# Patient Record
Sex: FEMALE | Race: WHITE | NOT HISPANIC OR LATINO | URBAN - METROPOLITAN AREA
[De-identification: names, ages, dates, MRNs, and addresses within clinical notes are randomized per-mention and may not be internally consistent; named-entity substitution may affect disease eponyms.]

---

## 2020-10-23 ENCOUNTER — APPOINTMENT (RX ONLY)
Dept: URBAN - METROPOLITAN AREA OTHER 4 | Facility: OTHER | Age: 80
Setting detail: DERMATOLOGY
End: 2020-10-23

## 2020-10-23 DIAGNOSIS — L82.1 OTHER SEBORRHEIC KERATOSIS: ICD-10-CM

## 2020-10-23 DIAGNOSIS — D22 MELANOCYTIC NEVI: ICD-10-CM

## 2020-10-23 DIAGNOSIS — D18.0 HEMANGIOMA: ICD-10-CM

## 2020-10-23 DIAGNOSIS — L20.89 OTHER ATOPIC DERMATITIS: ICD-10-CM

## 2020-10-23 DIAGNOSIS — D485 NEOPLASM OF UNCERTAIN BEHAVIOR OF SKIN: ICD-10-CM

## 2020-10-23 DIAGNOSIS — L57.8 OTHER SKIN CHANGES DUE TO CHRONIC EXPOSURE TO NONIONIZING RADIATION: ICD-10-CM

## 2020-10-23 PROBLEM — L20.84 INTRINSIC (ALLERGIC) ECZEMA: Status: ACTIVE | Noted: 2020-10-23

## 2020-10-23 PROBLEM — D18.01 HEMANGIOMA OF SKIN AND SUBCUTANEOUS TISSUE: Status: ACTIVE | Noted: 2020-10-23

## 2020-10-23 PROBLEM — D22.5 MELANOCYTIC NEVI OF TRUNK: Status: ACTIVE | Noted: 2020-10-23

## 2020-10-23 PROBLEM — D48.5 NEOPLASM OF UNCERTAIN BEHAVIOR OF SKIN: Status: ACTIVE | Noted: 2020-10-23

## 2020-10-23 PROCEDURE — 11102 TANGNTL BX SKIN SINGLE LES: CPT

## 2020-10-23 PROCEDURE — ? BIOPSY BY SHAVE METHOD

## 2020-10-23 PROCEDURE — 99203 OFFICE O/P NEW LOW 30 MIN: CPT | Mod: 25

## 2020-10-23 PROCEDURE — ? COUNSELING

## 2020-10-23 PROCEDURE — ? OBSERVATION

## 2020-10-23 ASSESSMENT — LOCATION SIMPLE DESCRIPTION DERM
LOCATION SIMPLE: LEFT LOWER BACK
LOCATION SIMPLE: LEFT THIGH
LOCATION SIMPLE: RIGHT FOREHEAD
LOCATION SIMPLE: RIGHT THIGH
LOCATION SIMPLE: UPPER BACK
LOCATION SIMPLE: LEFT UPPER BACK

## 2020-10-23 ASSESSMENT — LOCATION DETAILED DESCRIPTION DERM
LOCATION DETAILED: RIGHT SUPERIOR FOREHEAD
LOCATION DETAILED: LEFT MID-UPPER BACK
LOCATION DETAILED: RIGHT ANTERIOR PROXIMAL THIGH
LOCATION DETAILED: LEFT SUPERIOR MEDIAL MIDBACK
LOCATION DETAILED: LEFT INFERIOR UPPER BACK
LOCATION DETAILED: SUPERIOR THORACIC SPINE
LOCATION DETAILED: LEFT ANTERIOR PROXIMAL THIGH
LOCATION DETAILED: LEFT INFERIOR MEDIAL UPPER BACK

## 2020-10-23 ASSESSMENT — LOCATION ZONE DERM
LOCATION ZONE: FACE
LOCATION ZONE: LEG
LOCATION ZONE: TRUNK

## 2020-10-23 ASSESSMENT — SEVERITY ASSESSMENT 2020: SEVERITY 2020: MILD

## 2020-10-23 NOTE — PROCEDURE: BIOPSY BY SHAVE METHOD
Detail Level: Detailed
Depth Of Biopsy: dermis
Was A Bandage Applied: Yes
Size Of Lesion In Cm: 0.3
X Size Of Lesion In Cm: 0.2
Biopsy Type: H and E
Biopsy Method: Personna blade
Anesthesia Type: 1% lidocaine with 1:200,000 epinephrine
Anesthesia Volume In Cc: 1.5
Additional Anesthesia Volume In Cc (Will Not Render If 0): 0
Hemostasis: Aluminum Chloride
Wound Care: Petrolatum
Dressing: bandage
Destruction After The Procedure: No
Type Of Destruction Used: Curettage
Cryotherapy Text: The wound bed was treated with cryotherapy after the biopsy was performed.
Electrodesiccation Text: The wound bed was treated with electrodesiccation after the biopsy was performed.
Electrodesiccation And Curettage Text: The wound bed was treated with electrodesiccation and curettage after the biopsy was performed.
Silver Nitrate Text: The wound bed was treated with silver nitrate after the biopsy was performed.
Lab: [id number]
Lab Facility: [id number]
Consent: Written consent was obtained and risks were reviewed including but not limited to scarring, infection, bleeding, scabbing, incomplete removal, nerve damage and allergy to anesthesia.
Post-Care Instructions: I reviewed with the patient in detail post-care instructions. Patient is to keep the biopsy site dry overnight, and then apply polysporin/Vaseline x 3 days.
Notification Instructions: Patient will be notified of biopsy results. However, patient instructed to call the office if not contacted within 2 weeks.
Billing Type: Third-Party Bill
Information: Selecting Yes will display possible errors in your note based on the variables you have selected. This validation is only offered as a suggestion for you. PLEASE NOTE THAT THE VALIDATION TEXT WILL BE REMOVED WHEN YOU FINALIZE YOUR NOTE. IF YOU WANT TO FAX A PRELIMINARY NOTE YOU WILL NEED TO TOGGLE THIS TO 'NO' IF YOU DO NOT WANT IT IN YOUR FAXED NOTE.

## 2022-06-24 ENCOUNTER — CLAIMS CREATED FROM THE CLAIM WINDOW (OUTPATIENT)
Dept: URBAN - METROPOLITAN AREA CLINIC 27 | Facility: CLINIC | Age: 82
End: 2022-06-24
Payer: MEDICARE

## 2022-06-24 ENCOUNTER — WEB ENCOUNTER (OUTPATIENT)
Dept: URBAN - METROPOLITAN AREA CLINIC 27 | Facility: CLINIC | Age: 82
End: 2022-06-24

## 2022-06-24 ENCOUNTER — LAB OUTSIDE AN ENCOUNTER (OUTPATIENT)
Dept: URBAN - METROPOLITAN AREA CLINIC 27 | Facility: CLINIC | Age: 82
End: 2022-06-24

## 2022-06-24 VITALS — HEIGHT: 67 IN | WEIGHT: 160 LBS | BODY MASS INDEX: 25.11 KG/M2

## 2022-06-24 DIAGNOSIS — N39.42 INCONTINENCE WITHOUT SENSORY AWARENESS: ICD-10-CM

## 2022-06-24 DIAGNOSIS — K63.5 POLYP OF COLON: ICD-10-CM

## 2022-06-24 DIAGNOSIS — K21.9 GERD: ICD-10-CM

## 2022-06-24 DIAGNOSIS — Q75.2 HYPERTELORISM: ICD-10-CM

## 2022-06-24 PROCEDURE — 99244 OFF/OP CNSLTJ NEW/EST MOD 40: CPT | Performed by: INTERNAL MEDICINE

## 2022-06-24 PROCEDURE — 99204 OFFICE O/P NEW MOD 45 MIN: CPT | Performed by: INTERNAL MEDICINE

## 2022-06-24 RX ORDER — OMEPRAZOLE 10 MG/1
CAPSULE, DELAYED RELEASE ORAL
Qty: 0 | Refills: 0 | Status: ACTIVE | COMMUNITY
Start: 1900-01-01 | End: 1900-01-01

## 2022-06-24 RX ORDER — LEVOTHYROXINE SODIUM 200 UG/1
TAKE 1 TABLET (200 MCG) BY ORAL ROUTE ONCE DAILY TABLET ORAL 1
Qty: 0 | Refills: 0 | Status: ACTIVE | COMMUNITY
Start: 1900-01-01 | End: 1900-01-01

## 2022-06-24 RX ORDER — POTASSIUM CHLORIDE 750 MG/1
TAKE 2 CAPSULES (20 MEQ) BY ORAL ROUTE 2 TIMES PER DAY WITH FOOD CAPSULE, EXTENDED RELEASE ORAL 2
Qty: 0 | Refills: 0 | Status: ACTIVE | COMMUNITY
Start: 1900-01-01 | End: 1900-01-01

## 2022-06-24 RX ORDER — ZOLPIDEM TARTRATE 10 MG/1
TABLET, FILM COATED ORAL
Qty: 0 | Refills: 0 | Status: ACTIVE | COMMUNITY
Start: 1900-01-01 | End: 1900-01-01

## 2022-06-24 NOTE — HPI-TODAY'S VISIT:
83 yo female seen as a new patient for episodes of incontinance. Two vaignal births with episiotomy. States last colon over 4 year ago with multiple polyps removed. Recent CT scan abd unrevealing. No bleeding. Otherwise without complaints. Heartburn symptoms stable. No dysphagia.

## 2022-06-25 LAB
ALBUMIN/GLOBULIN RATIO: 1.8
ALBUMIN: 3.9
ALKALINE PHOSPHATASE: 69
ALT (SGPT): 8
AST (SGOT): 10
BILIRUBIN, DIRECT: 0.1
BILIRUBIN, INDIRECT: 0.5
BILIRUBIN, TOTAL: 0.6
GLOBULIN: 2.2
INR: 0.9
PROTEIN, TOTAL: 6.1
PT: 9.8

## 2022-06-29 ENCOUNTER — LAB OUTSIDE AN ENCOUNTER (OUTPATIENT)
Dept: URBAN - METROPOLITAN AREA CLINIC 27 | Facility: CLINIC | Age: 82
End: 2022-06-29

## 2022-06-29 PROBLEM — 22006008 HYPERTELORISM: Status: ACTIVE | Noted: 2022-06-29

## 2022-06-29 PROBLEM — 235595009 GASTROESOPHAGEAL REFLUX DISEASE: Status: ACTIVE | Noted: 2022-06-29

## 2022-08-31 PROBLEM — 68496003 POLYP OF COLON: Status: ACTIVE | Noted: 2022-06-29

## 2022-08-31 PROBLEM — 448521006 INCONTINENCE WITHOUT SENSORY AWARENESS: Status: ACTIVE | Noted: 2022-06-29

## 2022-09-14 ENCOUNTER — APPOINTMENT (RX ONLY)
Dept: URBAN - METROPOLITAN AREA OTHER 4 | Facility: OTHER | Age: 82
Setting detail: DERMATOLOGY
End: 2022-09-14

## 2022-09-14 DIAGNOSIS — L82.1 OTHER SEBORRHEIC KERATOSIS: ICD-10-CM

## 2022-09-14 DIAGNOSIS — L81.4 OTHER MELANIN HYPERPIGMENTATION: ICD-10-CM

## 2022-09-14 DIAGNOSIS — D18.0 HEMANGIOMA: ICD-10-CM

## 2022-09-14 DIAGNOSIS — B35.1 TINEA UNGUIUM: ICD-10-CM

## 2022-09-14 PROBLEM — D18.01 HEMANGIOMA OF SKIN AND SUBCUTANEOUS TISSUE: Status: ACTIVE | Noted: 2022-09-14

## 2022-09-14 PROCEDURE — ? COUNSELING

## 2022-09-14 PROCEDURE — 99213 OFFICE O/P EST LOW 20 MIN: CPT

## 2022-09-14 ASSESSMENT — LOCATION SIMPLE DESCRIPTION DERM
LOCATION SIMPLE: RIGHT UPPER BACK
LOCATION SIMPLE: RIGHT GREAT TOE
LOCATION SIMPLE: LEFT THIGH
LOCATION SIMPLE: LEFT UPPER BACK
LOCATION SIMPLE: UPPER BACK

## 2022-09-14 ASSESSMENT — LOCATION ZONE DERM
LOCATION ZONE: TRUNK
LOCATION ZONE: TOENAIL
LOCATION ZONE: LEG

## 2022-09-14 ASSESSMENT — LOCATION DETAILED DESCRIPTION DERM
LOCATION DETAILED: LEFT SUPERIOR UPPER BACK
LOCATION DETAILED: INFERIOR THORACIC SPINE
LOCATION DETAILED: RIGHT SUPERIOR UPPER BACK
LOCATION DETAILED: RIGHT GREAT TOENAIL
LOCATION DETAILED: LEFT ANTERIOR DISTAL THIGH

## 2022-09-29 ENCOUNTER — TELEPHONE ENCOUNTER (OUTPATIENT)
Dept: URBAN - METROPOLITAN AREA CLINIC 27 | Facility: CLINIC | Age: 82
End: 2022-09-29

## 2022-10-03 ENCOUNTER — TELEPHONE ENCOUNTER (OUTPATIENT)
Dept: URBAN - METROPOLITAN AREA CLINIC 27 | Facility: CLINIC | Age: 82
End: 2022-10-03

## 2022-10-04 ENCOUNTER — CLAIMS CREATED FROM THE CLAIM WINDOW (OUTPATIENT)
Dept: URBAN - METROPOLITAN AREA SURGERY CENTER 7 | Facility: SURGERY CENTER | Age: 82
End: 2022-10-04
Payer: MEDICARE

## 2022-10-04 DIAGNOSIS — Z86.010 ADENOMAS PERSONAL HISTORY OF COLONIC POLYPS: ICD-10-CM

## 2022-10-04 DIAGNOSIS — D12.3 ADENOMA OF TRANSVERSE COLON: ICD-10-CM

## 2022-10-04 PROCEDURE — 45385 COLONOSCOPY W/LESION REMOVAL: CPT | Performed by: INTERNAL MEDICINE

## 2022-10-04 PROCEDURE — G8907 PT DOC NO EVENTS ON DISCHARG: HCPCS | Performed by: CLINIC/CENTER

## 2022-10-04 PROCEDURE — 45385 COLONOSCOPY W/LESION REMOVAL: CPT | Performed by: CLINIC/CENTER

## 2022-10-04 PROCEDURE — G8907 PT DOC NO EVENTS ON DISCHARG: HCPCS | Performed by: INTERNAL MEDICINE

## 2022-10-04 RX ORDER — OMEPRAZOLE 10 MG/1
CAPSULE, DELAYED RELEASE ORAL
Qty: 0 | Refills: 0 | Status: ACTIVE | COMMUNITY
Start: 1900-01-01 | End: 1900-01-01

## 2022-10-04 RX ORDER — ZOLPIDEM TARTRATE 10 MG/1
TABLET, FILM COATED ORAL
Qty: 0 | Refills: 0 | Status: ACTIVE | COMMUNITY
Start: 1900-01-01 | End: 1900-01-01

## 2022-10-04 RX ORDER — LEVOTHYROXINE SODIUM 200 UG/1
TAKE 1 TABLET (200 MCG) BY ORAL ROUTE ONCE DAILY TABLET ORAL 1
Qty: 0 | Refills: 0 | Status: ACTIVE | COMMUNITY
Start: 1900-01-01 | End: 1900-01-01

## 2022-10-04 RX ORDER — POTASSIUM CHLORIDE 750 MG/1
TAKE 2 CAPSULES (20 MEQ) BY ORAL ROUTE 2 TIMES PER DAY WITH FOOD CAPSULE, EXTENDED RELEASE ORAL 2
Qty: 0 | Refills: 0 | Status: ACTIVE | COMMUNITY
Start: 1900-01-01 | End: 1900-01-01

## 2022-10-06 ENCOUNTER — TELEPHONE ENCOUNTER (OUTPATIENT)
Dept: URBAN - METROPOLITAN AREA CLINIC 27 | Facility: CLINIC | Age: 82
End: 2022-10-06

## 2022-12-16 ENCOUNTER — TELEPHONE ENCOUNTER (OUTPATIENT)
Dept: URBAN - METROPOLITAN AREA CLINIC 6 | Facility: CLINIC | Age: 82
End: 2022-12-16

## 2023-05-01 ENCOUNTER — TELEPHONE ENCOUNTER (OUTPATIENT)
Dept: URBAN - METROPOLITAN AREA CLINIC 27 | Facility: CLINIC | Age: 83
End: 2023-05-01

## 2023-08-10 ENCOUNTER — OFFICE VISIT (OUTPATIENT)
Dept: URBAN - METROPOLITAN AREA CLINIC 27 | Facility: CLINIC | Age: 83
End: 2023-08-10
Payer: MEDICARE

## 2023-08-10 ENCOUNTER — OFFICE VISIT (OUTPATIENT)
Dept: URBAN - METROPOLITAN AREA CLINIC 27 | Facility: CLINIC | Age: 83
End: 2023-08-10

## 2023-08-10 VITALS
WEIGHT: 140 LBS | HEART RATE: 96 BPM | HEIGHT: 67 IN | BODY MASS INDEX: 21.97 KG/M2 | DIASTOLIC BLOOD PRESSURE: 88 MMHG | SYSTOLIC BLOOD PRESSURE: 117 MMHG

## 2023-08-10 DIAGNOSIS — K63.5 POLYP OF COLON: ICD-10-CM

## 2023-08-10 DIAGNOSIS — R11.2 NAUSEA & VOMITING: ICD-10-CM

## 2023-08-10 DIAGNOSIS — K21.9 GERD: ICD-10-CM

## 2023-08-10 PROCEDURE — 99213 OFFICE O/P EST LOW 20 MIN: CPT | Performed by: INTERNAL MEDICINE

## 2023-08-10 RX ORDER — POTASSIUM CHLORIDE 750 MG/1
TAKE 2 CAPSULES (20 MEQ) BY ORAL ROUTE 2 TIMES PER DAY WITH FOOD CAPSULE, EXTENDED RELEASE ORAL 2
Qty: 0 | Refills: 0 | Status: ACTIVE | COMMUNITY
Start: 1900-01-01 | End: 1900-01-01

## 2023-08-10 RX ORDER — LEVOTHYROXINE SODIUM 200 UG/1
TAKE 1 TABLET (200 MCG) BY ORAL ROUTE ONCE DAILY TABLET ORAL 1
Qty: 0 | Refills: 0 | Status: ACTIVE | COMMUNITY
Start: 1900-01-01 | End: 1900-01-01

## 2023-08-10 RX ORDER — OMEPRAZOLE 10 MG/1
CAPSULE, DELAYED RELEASE ORAL
Qty: 0 | Refills: 0 | Status: ACTIVE | COMMUNITY
Start: 1900-01-01 | End: 1900-01-01

## 2023-08-10 RX ORDER — ZOLPIDEM TARTRATE 10 MG/1
TABLET, FILM COATED ORAL
Qty: 0 | Refills: 0 | Status: ACTIVE | COMMUNITY
Start: 1900-01-01 | End: 1900-01-01

## 2023-08-10 NOTE — HPI-TODAY'S VISIT:
This is a 83-year-old female seen in consultation today for her dyspeptic symptoms and loose stools.  These have been chronic in nature.  Occasionally formed.  2-3 bowel movements a day.  She has lost about 20 pounds over the past 2 years.  She has nausea.  In discussing this further with her as in our previous conversations she is drinking about a half a bottle of vodka every day.  She has high anxiety and depression.  She feels the medicines are inadequate.  In terms of the reflux she takes pantoprazole.  She had a CT scan that suggest chronic liver disease.  She has atrial fibrillation and is on Xarelto.  She had a colonoscopy last year with a tubular adenoma removed.

## 2023-09-14 ENCOUNTER — OFFICE VISIT (OUTPATIENT)
Dept: URBAN - METROPOLITAN AREA CLINIC 27 | Facility: CLINIC | Age: 83
End: 2023-09-14

## 2023-10-12 ENCOUNTER — OFFICE VISIT (OUTPATIENT)
Dept: URBAN - METROPOLITAN AREA CLINIC 27 | Facility: CLINIC | Age: 83
End: 2023-10-12

## 2023-10-18 ENCOUNTER — LAB OUTSIDE AN ENCOUNTER (OUTPATIENT)
Dept: URBAN - METROPOLITAN AREA CLINIC 27 | Facility: CLINIC | Age: 83
End: 2023-10-18

## 2023-10-18 ENCOUNTER — DASHBOARD ENCOUNTERS (OUTPATIENT)
Age: 83
End: 2023-10-18

## 2023-10-18 ENCOUNTER — OFFICE VISIT (OUTPATIENT)
Dept: URBAN - METROPOLITAN AREA CLINIC 27 | Facility: CLINIC | Age: 83
End: 2023-10-18
Payer: MEDICARE

## 2023-10-18 VITALS
HEART RATE: 94 BPM | DIASTOLIC BLOOD PRESSURE: 85 MMHG | WEIGHT: 137 LBS | RESPIRATION RATE: 17 BRPM | BODY MASS INDEX: 21.5 KG/M2 | SYSTOLIC BLOOD PRESSURE: 118 MMHG | HEIGHT: 67 IN

## 2023-10-18 DIAGNOSIS — K63.5 POLYP OF COLON: ICD-10-CM

## 2023-10-18 DIAGNOSIS — K58.0 IRRITABLE BOWEL SYNDROME WITH DIARRHEA: ICD-10-CM

## 2023-10-18 DIAGNOSIS — K74.69 OTHER CIRRHOSIS OF LIVER: ICD-10-CM

## 2023-10-18 DIAGNOSIS — K21.9 GERD: ICD-10-CM

## 2023-10-18 PROCEDURE — 99214 OFFICE O/P EST MOD 30 MIN: CPT | Performed by: INTERNAL MEDICINE

## 2023-10-18 RX ORDER — LEVOTHYROXINE SODIUM 200 UG/1
TAKE 1 TABLET (200 MCG) BY ORAL ROUTE ONCE DAILY TABLET ORAL 1
Qty: 0 | Refills: 0 | Status: ACTIVE | COMMUNITY
Start: 1900-01-01 | End: 1900-01-01

## 2023-10-18 RX ORDER — POTASSIUM CHLORIDE 750 MG/1
TAKE 2 CAPSULES (20 MEQ) BY ORAL ROUTE 2 TIMES PER DAY WITH FOOD CAPSULE, EXTENDED RELEASE ORAL 2
Qty: 0 | Refills: 0 | Status: ACTIVE | COMMUNITY
Start: 1900-01-01 | End: 1900-01-01

## 2023-10-18 RX ORDER — OMEPRAZOLE 10 MG/1
CAPSULE, DELAYED RELEASE ORAL
Qty: 0 | Refills: 0 | Status: ACTIVE | COMMUNITY
Start: 1900-01-01 | End: 1900-01-01

## 2023-10-18 RX ORDER — ZOLPIDEM TARTRATE 10 MG/1
TABLET, FILM COATED ORAL
Qty: 0 | Refills: 0 | Status: ACTIVE | COMMUNITY
Start: 1900-01-01 | End: 1900-01-01

## 2023-10-18 NOTE — HPI-TODAY'S VISIT:
This is a 83-year-old female seen in follow-up consultation for her loose stools.  In the past she had loose stools.  She had a hip replacement a few weeks ago and since then has been more constipated.  She states she only took 1 pain pill.  Her bowels are now improving and have not returned to diarrhea.  She has stopped drinking alcohol about 6 weeks ago but was drinking regularly.  Anxiety plays a role into her physical symptoms as well.  She did disclose today that she is taking Tylenol a gram every 8 hours.  She has previously been seen by  who has diagnosed her with cirrhosis.  Overall she is feeling better and recovering well from the hip replacement.  She is getting ready to move to [place] for 6 months and then will be back in [place] in the spring

## 2023-10-19 ENCOUNTER — APPOINTMENT (RX ONLY)
Dept: URBAN - METROPOLITAN AREA OTHER 9 | Facility: OTHER | Age: 83
Setting detail: DERMATOLOGY
End: 2023-10-19

## 2023-10-19 DIAGNOSIS — L57.0 ACTINIC KERATOSIS: ICD-10-CM

## 2023-10-19 DIAGNOSIS — D18.0 HEMANGIOMA: ICD-10-CM

## 2023-10-19 DIAGNOSIS — L82.1 OTHER SEBORRHEIC KERATOSIS: ICD-10-CM

## 2023-10-19 DIAGNOSIS — D22 MELANOCYTIC NEVI: ICD-10-CM

## 2023-10-19 DIAGNOSIS — L57.8 OTHER SKIN CHANGES DUE TO CHRONIC EXPOSURE TO NONIONIZING RADIATION: ICD-10-CM

## 2023-10-19 PROBLEM — D22.5 MELANOCYTIC NEVI OF TRUNK: Status: ACTIVE | Noted: 2023-10-19

## 2023-10-19 PROBLEM — D18.01 HEMANGIOMA OF SKIN AND SUBCUTANEOUS TISSUE: Status: ACTIVE | Noted: 2023-10-19

## 2023-10-19 LAB
A/G RATIO: 2.1
ABSOLUTE BASOPHILS: 22
ABSOLUTE EOSINOPHILS: 43
ABSOLUTE LYMPHOCYTES: 1152
ABSOLUTE MONOCYTES: 346
ABSOLUTE NEUTROPHILS: 5638
ALBUMIN: 3.9
ALKALINE PHOSPHATASE: 87
ALT (SGPT): 23
AST (SGOT): 24
BASOPHILS: 0.3
BILIRUBIN, TOTAL: 0.4
BUN/CREATININE RATIO: 14
BUN: 21
CALCIUM: 9.1
CARBON DIOXIDE, TOTAL: 23
CHLORIDE: 106
CREATININE: 1.48
EGFR: 35
EOSINOPHILS: 0.6
GLOBULIN, TOTAL: 1.9
GLUCOSE: 110
HEMATOCRIT: 28.2
HEMOGLOBIN: 9.2
INR: 1.1
LYMPHOCYTES: 16
MCH: 32.4
MCHC: 32.6
MCV: 99.3
MONOCYTES: 4.8
MPV: 10
NEUTROPHILS: 78.3
PLATELET COUNT: 390
POTASSIUM: 5.2
PROTEIN, TOTAL: 5.8
PT: 11.4
RDW: 11.9
RED BLOOD CELL COUNT: 2.84
SODIUM: 141
WHITE BLOOD CELL COUNT: 7.2

## 2023-10-19 PROCEDURE — ? OBSERVATION

## 2023-10-19 PROCEDURE — ? LIQUID NITROGEN

## 2023-10-19 PROCEDURE — ? COUNSELING

## 2023-10-19 PROCEDURE — 99213 OFFICE O/P EST LOW 20 MIN: CPT | Mod: 25

## 2023-10-19 PROCEDURE — 17000 DESTRUCT PREMALG LESION: CPT

## 2023-10-19 ASSESSMENT — LOCATION ZONE DERM
LOCATION ZONE: FACE
LOCATION ZONE: TRUNK

## 2023-10-19 ASSESSMENT — LOCATION DETAILED DESCRIPTION DERM
LOCATION DETAILED: LEFT MEDIAL UPPER BACK
LOCATION DETAILED: LEFT LATERAL SUPERIOR CHEST
LOCATION DETAILED: RIGHT INFERIOR FOREHEAD
LOCATION DETAILED: UPPER STERNUM
LOCATION DETAILED: SUPERIOR THORACIC SPINE

## 2023-10-19 ASSESSMENT — LOCATION SIMPLE DESCRIPTION DERM
LOCATION SIMPLE: RIGHT FOREHEAD
LOCATION SIMPLE: UPPER BACK
LOCATION SIMPLE: CHEST
LOCATION SIMPLE: LEFT UPPER BACK

## 2023-10-30 ENCOUNTER — TELEPHONE ENCOUNTER (OUTPATIENT)
Dept: URBAN - METROPOLITAN AREA CLINIC 27 | Facility: CLINIC | Age: 83
End: 2023-10-30

## 2024-05-17 ENCOUNTER — TELEPHONE ENCOUNTER (OUTPATIENT)
Dept: URBAN - METROPOLITAN AREA CLINIC 27 | Facility: CLINIC | Age: 84
End: 2024-05-17

## 2024-06-03 ENCOUNTER — TELEPHONE ENCOUNTER (OUTPATIENT)
Dept: URBAN - METROPOLITAN AREA CLINIC 63 | Facility: CLINIC | Age: 84
End: 2024-06-03

## 2024-06-04 ENCOUNTER — TELEPHONE ENCOUNTER (OUTPATIENT)
Dept: URBAN - METROPOLITAN AREA CLINIC 27 | Facility: CLINIC | Age: 84
End: 2024-06-04

## 2024-07-17 ENCOUNTER — OFFICE VISIT (OUTPATIENT)
Dept: URBAN - METROPOLITAN AREA CLINIC 27 | Facility: CLINIC | Age: 84
End: 2024-07-17
Payer: MEDICARE

## 2024-07-17 VITALS
BODY MASS INDEX: 20.25 KG/M2 | HEIGHT: 67 IN | SYSTOLIC BLOOD PRESSURE: 133 MMHG | DIASTOLIC BLOOD PRESSURE: 97 MMHG | HEART RATE: 97 BPM | WEIGHT: 129 LBS

## 2024-07-17 DIAGNOSIS — Q75.2 HYPERTELORISM: ICD-10-CM

## 2024-07-17 DIAGNOSIS — K59.09 CHANGE IN BOWEL MOVEMENTS INTERMITTENT CONSTIPATION. URGENCY IN THE MORNING.: ICD-10-CM

## 2024-07-17 DIAGNOSIS — F10.10 ALCOHOL ABUSE: ICD-10-CM

## 2024-07-17 DIAGNOSIS — K74.69 OTHER CIRRHOSIS OF LIVER: ICD-10-CM

## 2024-07-17 DIAGNOSIS — K21.9 GERD: ICD-10-CM

## 2024-07-17 PROCEDURE — 99214 OFFICE O/P EST MOD 30 MIN: CPT | Performed by: INTERNAL MEDICINE

## 2024-07-17 RX ORDER — OMEPRAZOLE 10 MG/1
CAPSULE, DELAYED RELEASE ORAL
Qty: 0 | Refills: 0 | Status: DISCONTINUED | COMMUNITY
Start: 1900-01-01

## 2024-07-17 RX ORDER — POTASSIUM CHLORIDE 750 MG/1
TAKE 2 CAPSULES (20 MEQ) BY ORAL ROUTE 2 TIMES PER DAY WITH FOOD CAPSULE, EXTENDED RELEASE ORAL 2
Qty: 0 | Refills: 0 | Status: DISCONTINUED | COMMUNITY
Start: 1900-01-01

## 2024-07-17 RX ORDER — LEVOTHYROXINE SODIUM 200 UG/1
TAKE 1 TABLET (200 MCG) BY ORAL ROUTE ONCE DAILY TABLET ORAL 1
Qty: 0 | Refills: 0 | Status: ACTIVE | COMMUNITY
Start: 1900-01-01

## 2024-07-17 RX ORDER — ZOLPIDEM TARTRATE 10 MG/1
TABLET, FILM COATED ORAL
Qty: 0 | Refills: 0 | Status: ACTIVE | COMMUNITY
Start: 1900-01-01

## 2024-07-17 NOTE — HPI-TODAY'S VISIT:
This is a 84-year-old female seen in consultation today for her constipation and history of cirrhosis.  She is also followed by Dr. Bailon for the liver disease.  She has not seen him in about 10 months.  She has not been seen in our office as well.  She spent the winter in [place].  Over the wintertime she started drinking alcohol again.  She states that she stopped drinking yesterday.  She has lost about 30 pounds.  She has constipation on and off and uses stool and laxatives on and off.  She also takes pantoprazole intermittently but also has reflux symptoms.  She had a recent MRI which was unrevealing as far as the liver there is a mild dilation of the pancreatic duct which is stable.  She is status post hip replacement.  She has not had a recent upper endoscopy.  Dr. Saucedo is her PCP.  She has not seen him in a while as well

## 2024-07-18 LAB
A/G RATIO: 2.3
AFP, SERUM, TUMOR MARKER: 1.9
ALBUMIN: 4.5
ALKALINE PHOSPHATASE: 95
ALT (SGPT): 18
AST (SGOT): 16
BILIRUBIN, TOTAL: 0.7
BUN/CREATININE RATIO: 21
BUN: 28
CALCIUM: 9.7
CARBON DIOXIDE, TOTAL: 25
CHLORIDE: 100
CREATININE: 1.31
EGFR: 40
GLOBULIN, TOTAL: 2
GLUCOSE: 101
HEMATOCRIT: 37.2
HEMOGLOBIN: 12.2
INR: 1
MCH: 33.2
MCHC: 32.8
MCV: 101.1
MPV: 10.5
PLATELET COUNT: 239
POTASSIUM: 4.7
PROTEIN, TOTAL: 6.5
PT: 10.8
RDW: 12.7
RED BLOOD CELL COUNT: 3.68
SODIUM: 140
TSH: 0.23
WHITE BLOOD CELL COUNT: 6.2

## 2024-07-19 ENCOUNTER — TELEPHONE ENCOUNTER (OUTPATIENT)
Dept: URBAN - METROPOLITAN AREA CLINIC 27 | Facility: CLINIC | Age: 84
End: 2024-07-19

## 2024-07-19 RX ORDER — ONDANSETRON 4 MG/1
1 TABLET ON THE TONGUE AND ALLOW TO DISSOLVE TABLET, ORALLY DISINTEGRATING ORAL
Qty: 30 | Refills: 0 | OUTPATIENT
Start: 2024-07-22

## 2024-08-26 ENCOUNTER — OFFICE VISIT (OUTPATIENT)
Dept: URBAN - METROPOLITAN AREA CLINIC 27 | Facility: CLINIC | Age: 84
End: 2024-08-26
Payer: MEDICARE

## 2024-08-26 VITALS
SYSTOLIC BLOOD PRESSURE: 104 MMHG | DIASTOLIC BLOOD PRESSURE: 71 MMHG | BODY MASS INDEX: 19.87 KG/M2 | HEART RATE: 75 BPM | WEIGHT: 126.6 LBS | HEIGHT: 67 IN

## 2024-08-26 DIAGNOSIS — K21.9 GERD: ICD-10-CM

## 2024-08-26 DIAGNOSIS — R19.4 CHANGE IN BOWEL HABIT: ICD-10-CM

## 2024-08-26 DIAGNOSIS — N39.42 INCONTINENCE WITHOUT SENSORY AWARENESS: ICD-10-CM

## 2024-08-26 DIAGNOSIS — R63.4 ABNORMAL WEIGHT LOSS: ICD-10-CM

## 2024-08-26 DIAGNOSIS — Z12.11 ENCOUNTER FOR SCREENING FOR MALIGNANT NEOPLASM OF COLON: ICD-10-CM

## 2024-08-26 DIAGNOSIS — E53.8 B12 DEFICIENCY: ICD-10-CM

## 2024-08-26 PROCEDURE — 99214 OFFICE O/P EST MOD 30 MIN: CPT | Performed by: INTERNAL MEDICINE

## 2024-08-26 RX ORDER — LEVOTHYROXINE SODIUM 200 UG/1
TAKE 1 TABLET (200 MCG) BY ORAL ROUTE ONCE DAILY TABLET ORAL 1
Qty: 0 | Refills: 0 | Status: ACTIVE | COMMUNITY
Start: 1900-01-01

## 2024-08-26 RX ORDER — ONDANSETRON 4 MG/1
1 TABLET ON THE TONGUE AND ALLOW TO DISSOLVE TABLET, ORALLY DISINTEGRATING ORAL
Qty: 30 | Refills: 0 | Status: DISCONTINUED | COMMUNITY
Start: 2024-07-22

## 2024-08-26 RX ORDER — ZOLPIDEM TARTRATE 10 MG/1
TABLET, FILM COATED ORAL
Qty: 0 | Refills: 0 | Status: ACTIVE | COMMUNITY
Start: 1900-01-01

## 2024-08-26 NOTE — HPI-TODAY'S VISIT:
This is a 84-year-old female with a history of alcoholic cirrhosis seen in follow-up consultation today.  Her psychiatrist at [facility] is not working out for her.  She has been feeling more depressed.  She is not drinking alcohol currently but did drink for a week in the recent past.  She did reach out to Alcoholics Anonymous and is trying to go to a meeting. She is also complaining of some alternating hard stools and loose stools.  She also has some reflux but is only taking pantoprazole as needed.  She has had some mild weight loss to.  No recent colonoscopy.  She is taking oral B12 and was noted to have an elevated MCV on recent labs.  Recent liver enzymes were normal INR was 1 creatinine 1.3 alpha-fetoprotein 1.9.  She is followed for her cirrhosis with Dr. Burdick at [facility]

## 2024-08-27 ENCOUNTER — LAB OUTSIDE AN ENCOUNTER (OUTPATIENT)
Dept: URBAN - METROPOLITAN AREA CLINIC 27 | Facility: CLINIC | Age: 84
End: 2024-08-27

## 2024-08-27 LAB
FOLATE, SERUM: 8.4
VITAMIN B12: >2000

## 2024-09-04 ENCOUNTER — TELEPHONE ENCOUNTER (OUTPATIENT)
Dept: URBAN - METROPOLITAN AREA CLINIC 27 | Facility: CLINIC | Age: 84
End: 2024-09-04

## 2024-09-11 ENCOUNTER — OFFICE VISIT (OUTPATIENT)
Dept: URBAN - METROPOLITAN AREA SURGERY CENTER 7 | Facility: SURGERY CENTER | Age: 84
End: 2024-09-11

## 2024-11-18 ENCOUNTER — OFFICE VISIT (OUTPATIENT)
Dept: URBAN - METROPOLITAN AREA CLINIC 27 | Facility: CLINIC | Age: 84
End: 2024-11-18
Payer: MEDICARE

## 2024-11-18 VITALS
HEART RATE: 68 BPM | BODY MASS INDEX: 19.15 KG/M2 | DIASTOLIC BLOOD PRESSURE: 70 MMHG | WEIGHT: 122 LBS | HEIGHT: 67 IN | SYSTOLIC BLOOD PRESSURE: 104 MMHG

## 2024-11-18 DIAGNOSIS — R10.13 EPIGASTRIC PAIN: ICD-10-CM

## 2024-11-18 DIAGNOSIS — F10.10 ALCOHOL ABUSE: ICD-10-CM

## 2024-11-18 DIAGNOSIS — K74.69 OTHER CIRRHOSIS OF LIVER: ICD-10-CM

## 2024-11-18 PROCEDURE — 99214 OFFICE O/P EST MOD 30 MIN: CPT | Performed by: PHYSICIAN ASSISTANT

## 2024-11-18 RX ORDER — LEVOTHYROXINE SODIUM 200 UG/1
TAKE 1 TABLET (200 MCG) BY ORAL ROUTE ONCE DAILY TABLET ORAL 1
Qty: 0 | Refills: 0 | Status: ACTIVE | COMMUNITY
Start: 1900-01-01

## 2024-11-18 RX ORDER — ZOLPIDEM TARTRATE 10 MG/1
TABLET, FILM COATED ORAL
Qty: 0 | Refills: 0 | Status: ACTIVE | COMMUNITY
Start: 1900-01-01

## 2024-11-18 NOTE — HPI-ZZZTODAY'S VISIT
Ms. Carrillo is an 84-year-old here for follow up of her alcoholic cirrhosis with Dr. Brand. She is still actively drinking, had 2 glasses wine and some port last night; prior to that it had been 1 wk since she had had alcohol. She went to AA briefly but didn't think it was helpful as she denies having any cravings for alcohol. She is hoping to abstain entirely. She last saw Dr. Burdick in August 2023. She c/o some epigastric discomfort. She has alternating mushy and hard stools; no melena or hematochezia. She is on Xarelto for A fib, has a cardiology appt (Dr. Brian Null) later this wk. No prior EGD. . Colonoscopy 2022: 1 small adenomatous polyp, diverticulosis MRI abdomen 5/2024: No concerning liver lesion, follow-up 6 months, similar to prior dilatation of main PD, etiology uncertain

## 2024-11-19 ENCOUNTER — TELEPHONE ENCOUNTER (OUTPATIENT)
Dept: URBAN - METROPOLITAN AREA CLINIC 27 | Facility: CLINIC | Age: 84
End: 2024-11-19

## 2024-11-19 PROBLEM — 79922009: Status: ACTIVE | Noted: 2024-11-19

## 2024-11-19 LAB
A/G RATIO: 2.1
ABSOLUTE BASOPHILS: 20
ABSOLUTE EOSINOPHILS: 20
ABSOLUTE LYMPHOCYTES: 1215
ABSOLUTE MONOCYTES: 441
ABSOLUTE NEUTROPHILS: 3205
AFP, SERUM, TUMOR MARKER: 1.6
ALBUMIN: 4.4
ALKALINE PHOSPHATASE: 139
ALT (SGPT): 24
AST (SGOT): 24
BASOPHILS: 0.4
BILIRUBIN, TOTAL: 0.5
BUN/CREATININE RATIO: 26
BUN: 33
CALCIUM: 9.5
CARBON DIOXIDE, TOTAL: 26
CHLORIDE: 103
CREATININE: 1.28
EGFR: 41
EOSINOPHILS: 0.4
GLOBULIN, TOTAL: 2.1
GLUCOSE: 99
HEMATOCRIT: 34.7
HEMOGLOBIN: 11.4
INR: 1.1
LYMPHOCYTES: 24.8
MCH: 33.9
MCHC: 32.9
MCV: 103.3
MONOCYTES: 9
MPV: 10.5
NEUTROPHILS: 65.4
PLATELET COUNT: 220
POTASSIUM: 4.7
PROTEIN, TOTAL: 6.5
PT: 11.9
RDW: 12.8
RED BLOOD CELL COUNT: 3.36
SODIUM: 139
WHITE BLOOD CELL COUNT: 4.9

## 2024-11-25 ENCOUNTER — TELEPHONE ENCOUNTER (OUTPATIENT)
Dept: URBAN - METROPOLITAN AREA CLINIC 27 | Facility: CLINIC | Age: 84
End: 2024-11-25

## 2024-11-26 ENCOUNTER — TELEPHONE ENCOUNTER (OUTPATIENT)
Dept: URBAN - METROPOLITAN AREA CLINIC 27 | Facility: CLINIC | Age: 84
End: 2024-11-26

## 2024-12-02 ENCOUNTER — OFFICE VISIT (OUTPATIENT)
Dept: URBAN - METROPOLITAN AREA CLINIC 27 | Facility: CLINIC | Age: 84
End: 2024-12-02

## 2025-06-27 ENCOUNTER — OFFICE VISIT (OUTPATIENT)
Dept: URBAN - METROPOLITAN AREA CLINIC 27 | Facility: CLINIC | Age: 85
End: 2025-06-27

## 2025-06-27 RX ORDER — LEVOTHYROXINE SODIUM 75 UG/1
TAKE 1 TABLET (75 MCG) BY MOUTH DAILY BEFORE BREAKFAST. THIS REPLACES THE 100 MCG DOSE SIZE TABLET ORAL
Qty: 90 EACH | Refills: 0 | Status: ACTIVE | COMMUNITY

## 2025-06-27 RX ORDER — METOPROLOL SUCCINATE 25 MG/1
TABLET, FILM COATED, EXTENDED RELEASE ORAL
Qty: 25 TABLET | Status: ACTIVE | COMMUNITY

## 2025-06-27 RX ORDER — ZOLPIDEM TARTRATE 10 MG/1
TABLET, FILM COATED ORAL
Qty: 0 | Refills: 0 | Status: ACTIVE | COMMUNITY
Start: 1900-01-01

## 2025-06-27 RX ORDER — LEVOTHYROXINE SODIUM 200 UG/1
TAKE 1 TABLET (200 MCG) BY ORAL ROUTE ONCE DAILY TABLET ORAL 1
Qty: 0 | Refills: 0 | Status: DISCONTINUED | COMMUNITY
Start: 1900-01-01

## 2025-06-28 LAB
A/G RATIO: 2.6
ABSOLUTE BASOPHILS: 29
ABSOLUTE EOSINOPHILS: 59
ABSOLUTE LYMPHOCYTES: 1323
ABSOLUTE MONOCYTES: 490
ABSOLUTE NEUTROPHILS: 2999
ALBUMIN: 4.4
ALKALINE PHOSPHATASE: 101
ALT (SGPT): 20
AST (SGOT): 23
BASOPHILS: 0.6
BILIRUBIN, TOTAL: 0.7
BUN/CREATININE RATIO: 23
BUN: 27
CALCIUM: 9.3
CARBON DIOXIDE, TOTAL: 28
CHLORIDE: 100
CREATININE: 1.17
EGFR: 46
EOSINOPHILS: 1.2
GLOBULIN, TOTAL: 1.7
GLUCOSE: 91
HEMATOCRIT: 37.6
HEMOGLOBIN: 12.5
LYMPHOCYTES: 27
MCH: 33.5
MCHC: 33.2
MCV: 100.8
MONOCYTES: 10
MPV: 10.3
NEUTROPHILS: 61.2
PLATELET COUNT: 225
POTASSIUM: 4.7
PROTEIN, TOTAL: 6.1
RDW: 12
RED BLOOD CELL COUNT: 3.73
SODIUM: 137
WHITE BLOOD CELL COUNT: 4.9

## 2025-07-28 ENCOUNTER — OFFICE VISIT (OUTPATIENT)
Dept: URBAN - METROPOLITAN AREA CLINIC 27 | Facility: CLINIC | Age: 85
End: 2025-07-28
Payer: MEDICARE

## 2025-07-28 DIAGNOSIS — K21.9 GASTRO-ESOPHAGEAL REFLUX DISEASE WITHOUT ESOPHAGITIS: ICD-10-CM

## 2025-07-28 DIAGNOSIS — F10.10 ALCOHOL ABUSE, UNCOMPLICATED: ICD-10-CM

## 2025-07-28 DIAGNOSIS — K74.69 OTHER CIRRHOSIS OF LIVER: ICD-10-CM

## 2025-07-28 DIAGNOSIS — K59.00 CONSTIPATION, UNSPECIFIED: ICD-10-CM

## 2025-07-28 DIAGNOSIS — R63.4 ABNORMAL WEIGHT LOSS: ICD-10-CM

## 2025-07-28 PROCEDURE — 99214 OFFICE O/P EST MOD 30 MIN: CPT | Performed by: INTERNAL MEDICINE

## 2025-07-28 RX ORDER — METOPROLOL SUCCINATE 25 MG/1
TABLET, FILM COATED, EXTENDED RELEASE ORAL
Qty: 25 TABLET | Status: ACTIVE | COMMUNITY

## 2025-07-28 RX ORDER — ZOLPIDEM TARTRATE 10 MG/1
TABLET, FILM COATED ORAL
Qty: 0 | Refills: 0 | Status: ACTIVE | COMMUNITY
Start: 1900-01-01

## 2025-07-28 RX ORDER — LEVOTHYROXINE SODIUM 75 UG/1
TAKE 1 TABLET (75 MCG) BY MOUTH DAILY BEFORE BREAKFAST. THIS REPLACES THE 100 MCG DOSE SIZE TABLET ORAL
Qty: 90 EACH | Refills: 0 | Status: ACTIVE | COMMUNITY

## 2025-07-28 NOTE — HPI-HPI
Alexandria Shukla, an 85-year-old female, presented for a chronic condition follow-up focused on her ongoing gastrointestinal symptoms and related concerns. She described a fluctuating pattern of bowel movements, alternating between episodes of constipation and loose stools. At times, she passes hard, marble-like stools, occasionally accompanied by liquid stool that seems to bypass the harder material. She expressed frustration with the unpredictability of her bowel habits and noted that she sometimes does not feel the passage of stool. To address these issues, she has been taking Benefiber and Colace as recommended, though she admitted to occasional lapses in compliance. Motivated to improve her regimen, she has organized her medications and is willing to adjust her Colace dose as needed. In addition to her bowel irregularity, she reported frequent nausea and significant heartburn, characterized by deep, loud burping that provides some relief. These symptoms are most pronounced in the middle of the day and evening. She is currently taking pantoprazole once daily in the morning but is uncertain if it is effective, as she has not noticed significant improvement. She acknowledged forgetting to take the medication at times and has recently started eating steel cut oatmeal in an effort to improve her symptoms. She is considering adding Pepcid at night as suggested. Her history of liver disease remains a significant concern. She recently resumed drinking port wine but stopped two days ago, recognizing the risk of further liver damage. She expressed motivation to remain abstinent and understands the importance of avoiding alcohol due to her cirrhosis. While she considered attending support groups, she prefers to maintain sobriety independently. Weight loss is another ongoing issue. She is unsure if she has lost additional weight since her last visit but remains concerned about the cause, specifically questioning the possibility of malignancy. Previous blood work was normal,  She also mentioned experiencing increased stress due to multiple life events. Finally, she reported musculoskeletal complaints, including a cracked sacrum and neck pain. She sought advice on the appropriate specialist, expressing a preference for a non-surgical orthopedist, and has an appointment scheduled with a physical therapist to address these concerns.